# Patient Record
Sex: FEMALE | Race: OTHER | URBAN - METROPOLITAN AREA
[De-identification: names, ages, dates, MRNs, and addresses within clinical notes are randomized per-mention and may not be internally consistent; named-entity substitution may affect disease eponyms.]

---

## 2021-05-13 ENCOUNTER — OFFICE VISIT (OUTPATIENT)
Dept: URBAN - METROPOLITAN AREA CLINIC 12 | Facility: CLINIC | Age: 36
End: 2021-05-13
Payer: SELF-PAY

## 2021-05-13 ENCOUNTER — DASHBOARD ENCOUNTERS (OUTPATIENT)
Age: 36
End: 2021-05-13

## 2021-05-13 DIAGNOSIS — Q79.60 EHLERS-DANLOS SYNDROME: ICD-10-CM

## 2021-05-13 DIAGNOSIS — G20: ICD-10-CM

## 2021-05-13 DIAGNOSIS — K31.84 GASTROPARESIS: ICD-10-CM

## 2021-05-13 PROCEDURE — 99213 OFFICE O/P EST LOW 20 MIN: CPT | Performed by: STUDENT IN AN ORGANIZED HEALTH CARE EDUCATION/TRAINING PROGRAM

## 2021-05-13 NOTE — HPI-TODAY'S VISIT:
36 yo F with Tahir Danlos, juvenile parkinson's here for follow up of gastroparesis.  Preveiously seen Dr. Henley She said she was diagnosed many years ago with gastroparesis, thought that juvenile parkinson's is underlying etiology. Adheres to many lifestyle modifications for gastroparesis including eating small meals. SHe feels like her symptoms are overall well controlled, occasionally has vomiting particularly in the morning. Eats 1.5 meals daily, her weight is stable. She denies abdominal pain.  She will have 1L of IV NS appx 1x a week because of dehydration.

## 2021-05-16 PROBLEM — 398114001: Status: ACTIVE | Noted: 2021-05-16
